# Patient Record
Sex: FEMALE | Race: WHITE | NOT HISPANIC OR LATINO | Employment: FULL TIME | URBAN - METROPOLITAN AREA
[De-identification: names, ages, dates, MRNs, and addresses within clinical notes are randomized per-mention and may not be internally consistent; named-entity substitution may affect disease eponyms.]

---

## 2023-06-22 LAB
ALANINE AMINOTRANSFERASE (SGPT) (U/L) IN SER/PLAS: 31 U/L (ref 7–45)
ALBUMIN (G/DL) IN SER/PLAS: 4.7 G/DL (ref 3.4–5)
ALKALINE PHOSPHATASE (U/L) IN SER/PLAS: 82 U/L (ref 33–110)
ANION GAP IN SER/PLAS: 15 MMOL/L (ref 10–20)
ASPARTATE AMINOTRANSFERASE (SGOT) (U/L) IN SER/PLAS: 25 U/L (ref 9–39)
BASOPHILS (10*3/UL) IN BLOOD BY AUTOMATED COUNT: 0.03 X10E9/L (ref 0–0.1)
BASOPHILS/100 LEUKOCYTES IN BLOOD BY AUTOMATED COUNT: 0.3 % (ref 0–2)
BILIRUBIN TOTAL (MG/DL) IN SER/PLAS: 0.7 MG/DL (ref 0–1.2)
CALCIUM (MG/DL) IN SER/PLAS: 9.7 MG/DL (ref 8.6–10.6)
CARBON DIOXIDE, TOTAL (MMOL/L) IN SER/PLAS: 28 MMOL/L (ref 21–32)
CHLORIDE (MMOL/L) IN SER/PLAS: 101 MMOL/L (ref 98–107)
CHOLESTEROL (MG/DL) IN SER/PLAS: 205 MG/DL (ref 0–199)
CHOLESTEROL IN HDL (MG/DL) IN SER/PLAS: 49.7 MG/DL
CHOLESTEROL/HDL RATIO: 4.1
CREATININE (MG/DL) IN SER/PLAS: 0.85 MG/DL (ref 0.5–1.05)
EOSINOPHILS (10*3/UL) IN BLOOD BY AUTOMATED COUNT: 0.08 X10E9/L (ref 0–0.7)
EOSINOPHILS/100 LEUKOCYTES IN BLOOD BY AUTOMATED COUNT: 0.9 % (ref 0–6)
ERYTHROCYTE DISTRIBUTION WIDTH (RATIO) BY AUTOMATED COUNT: 12.4 % (ref 11.5–14.5)
ERYTHROCYTE MEAN CORPUSCULAR HEMOGLOBIN CONCENTRATION (G/DL) BY AUTOMATED: 33.9 G/DL (ref 32–36)
ERYTHROCYTE MEAN CORPUSCULAR VOLUME (FL) BY AUTOMATED COUNT: 89 FL (ref 80–100)
ERYTHROCYTES (10*6/UL) IN BLOOD BY AUTOMATED COUNT: 4.55 X10E12/L (ref 4–5.2)
GFR FEMALE: 82 ML/MIN/1.73M2
GLUCOSE (MG/DL) IN SER/PLAS: 90 MG/DL (ref 74–99)
HEMATOCRIT (%) IN BLOOD BY AUTOMATED COUNT: 40.4 % (ref 36–46)
HEMOGLOBIN (G/DL) IN BLOOD: 13.7 G/DL (ref 12–16)
IMMATURE GRANULOCYTES/100 LEUKOCYTES IN BLOOD BY AUTOMATED COUNT: 0.2 % (ref 0–0.9)
LDL: 122 MG/DL (ref 0–99)
LEUKOCYTES (10*3/UL) IN BLOOD BY AUTOMATED COUNT: 9 X10E9/L (ref 4.4–11.3)
LYMPHOCYTES (10*3/UL) IN BLOOD BY AUTOMATED COUNT: 3.41 X10E9/L (ref 1.2–4.8)
LYMPHOCYTES/100 LEUKOCYTES IN BLOOD BY AUTOMATED COUNT: 38.1 % (ref 13–44)
MONOCYTES (10*3/UL) IN BLOOD BY AUTOMATED COUNT: 0.58 X10E9/L (ref 0.1–1)
MONOCYTES/100 LEUKOCYTES IN BLOOD BY AUTOMATED COUNT: 6.5 % (ref 2–10)
NEUTROPHILS (10*3/UL) IN BLOOD BY AUTOMATED COUNT: 4.83 X10E9/L (ref 1.2–7.7)
NEUTROPHILS/100 LEUKOCYTES IN BLOOD BY AUTOMATED COUNT: 54 % (ref 40–80)
NRBC (PER 100 WBCS) BY AUTOMATED COUNT: 0 /100 WBC (ref 0–0)
PLATELETS (10*3/UL) IN BLOOD AUTOMATED COUNT: 228 X10E9/L (ref 150–450)
POTASSIUM (MMOL/L) IN SER/PLAS: 3.5 MMOL/L (ref 3.5–5.3)
PROTEIN TOTAL: 7.3 G/DL (ref 6.4–8.2)
SODIUM (MMOL/L) IN SER/PLAS: 140 MMOL/L (ref 136–145)
TRIGLYCERIDE (MG/DL) IN SER/PLAS: 165 MG/DL (ref 0–149)
UREA NITROGEN (MG/DL) IN SER/PLAS: 14 MG/DL (ref 6–23)
VLDL: 33 MG/DL (ref 0–40)

## 2023-11-02 PROBLEM — F17.210: Status: ACTIVE | Noted: 2023-11-02

## 2023-11-02 PROBLEM — E66.9 OBESITY: Status: ACTIVE | Noted: 2023-11-02

## 2023-11-02 PROBLEM — E78.5 HYPERLIPIDEMIA: Status: ACTIVE | Noted: 2023-11-02

## 2023-11-02 PROBLEM — M19.90 OSTEOARTHRITIS: Status: ACTIVE | Noted: 2023-11-02

## 2023-11-02 PROBLEM — M51.34 DEGENERATION OF INTERVERTEBRAL DISC OF THORACIC REGION: Status: ACTIVE | Noted: 2023-11-02

## 2023-11-02 PROBLEM — N94.9 ADNEXAL CYST: Status: ACTIVE | Noted: 2023-11-02

## 2023-11-02 PROBLEM — M54.31 BILATERAL SCIATICA: Status: ACTIVE | Noted: 2023-11-02

## 2023-11-02 PROBLEM — K21.9 GASTROESOPHAGEAL REFLUX DISEASE: Status: ACTIVE | Noted: 2023-11-02

## 2023-11-02 PROBLEM — I10 HYPERTENSION: Status: ACTIVE | Noted: 2023-11-02

## 2023-11-02 PROBLEM — M54.32 BILATERAL SCIATICA: Status: ACTIVE | Noted: 2023-11-02

## 2023-11-02 RX ORDER — METOPROLOL SUCCINATE 25 MG/1
1 TABLET, EXTENDED RELEASE ORAL DAILY
COMMUNITY
Start: 2019-04-29 | End: 2024-02-23 | Stop reason: SDUPTHER

## 2023-11-02 RX ORDER — CHLORTHALIDONE 25 MG/1
1 TABLET ORAL DAILY
COMMUNITY
Start: 2020-05-11 | End: 2023-12-26 | Stop reason: SDUPTHER

## 2023-11-02 RX ORDER — MELOXICAM 15 MG/1
1 TABLET ORAL DAILY
COMMUNITY
Start: 2019-04-29 | End: 2024-01-24 | Stop reason: SDUPTHER

## 2023-11-02 RX ORDER — UBIDECARENONE 30 MG
CAPSULE ORAL
COMMUNITY
Start: 2021-12-13

## 2023-11-02 RX ORDER — LOSARTAN POTASSIUM 100 MG/1
1 TABLET ORAL DAILY
COMMUNITY
Start: 2019-09-09 | End: 2024-02-23 | Stop reason: SDUPTHER

## 2023-11-02 RX ORDER — ACETAMINOPHEN 500 MG
1 TABLET ORAL DAILY
COMMUNITY
Start: 2016-08-29

## 2023-11-02 RX ORDER — CHOLECALCIFEROL (VITAMIN D3) 25 MCG
1 TABLET ORAL DAILY
COMMUNITY
End: 2023-12-14 | Stop reason: ALTCHOICE

## 2023-11-02 RX ORDER — CALC/MAG/B COMPLEX/D3/HERB 61
1 TABLET ORAL AS NEEDED
COMMUNITY
Start: 2014-02-10

## 2023-11-02 RX ORDER — OXYCODONE HYDROCHLORIDE 5 MG/1
1 TABLET ORAL EVERY 6 HOURS PRN
COMMUNITY
Start: 2022-12-08 | End: 2023-12-14 | Stop reason: ALTCHOICE

## 2023-11-02 RX ORDER — KRILL/OM-3/DHA/EPA/PHOSPHO/AST 1000-170MG
CAPSULE ORAL DAILY
COMMUNITY
Start: 2016-02-29

## 2023-11-02 RX ORDER — GLUCOSAM/CHONDRO/HERB 149/HYAL 750-100 MG
1 TABLET ORAL DAILY
COMMUNITY
End: 2023-12-14 | Stop reason: ALTCHOICE

## 2023-11-02 RX ORDER — CYCLOBENZAPRINE HCL 10 MG
TABLET ORAL AS NEEDED
COMMUNITY

## 2023-11-03 ENCOUNTER — OFFICE VISIT (OUTPATIENT)
Dept: OBSTETRICS AND GYNECOLOGY | Facility: CLINIC | Age: 53
End: 2023-11-03
Payer: COMMERCIAL

## 2023-11-03 VITALS
SYSTOLIC BLOOD PRESSURE: 120 MMHG | DIASTOLIC BLOOD PRESSURE: 93 MMHG | WEIGHT: 218 LBS | HEIGHT: 67 IN | BODY MASS INDEX: 34.21 KG/M2

## 2023-11-03 DIAGNOSIS — Z01.419 ENCOUNTER FOR GYNECOLOGICAL EXAMINATION WITHOUT ABNORMAL FINDING: Primary | ICD-10-CM

## 2023-11-03 PROCEDURE — 99396 PREV VISIT EST AGE 40-64: CPT | Performed by: OBSTETRICS & GYNECOLOGY

## 2023-11-03 PROCEDURE — 3074F SYST BP LT 130 MM HG: CPT | Performed by: OBSTETRICS & GYNECOLOGY

## 2023-11-03 PROCEDURE — 3008F BODY MASS INDEX DOCD: CPT | Performed by: OBSTETRICS & GYNECOLOGY

## 2023-11-03 PROCEDURE — 3080F DIAST BP >= 90 MM HG: CPT | Performed by: OBSTETRICS & GYNECOLOGY

## 2023-11-04 NOTE — PROGRESS NOTES
Subjective   Marlin Lopez is a 53 y.o. female here for a routine exam. Current complaints: She had a screening mammogram through her PCP in 2023 that showed a left breast mass.  Diagnostic imaging is ordered.    She has a history of BSO with Gyn Oncology in 2022 that was benign after imaging showed complex right ovarian cyst.    She has no postmenopausal bleeding, no pelvic pain, no dysuria, no change in bowel habits or vaginal discharge.    She is current on her Cologuard.    Personal health questionnaire reviewed: yes.     Gynecologic History  Patient's last menstrual period was 2022.  Contraception: post menopausal status  Last Pap: 22. Results were: normal  Last mammogram: 23. Results were:  Follow-up diagnostic imaging for the left breast is managed by her PCP.    Obstetric History  OB History    Para Term  AB Living   0 0 0 0 0 0   SAB IAB Ectopic Multiple Live Births   0 0 0 0 0       Objective   Constitutional: Alert and in no acute distress. Well developed, well nourished.   Head and Face: Head and face: Normal.    Eyes: Normal external exam - nonicteric sclera, extraocular movements intact (EOMI) and no ptosis.   Neck: No neck asymmetry. Supple. Thyroid not enlarged and there were no palpable thyroid nodules.    Pulmonary: No respiratory distress.   Chest: Breasts: Normal appearance, no nipple discharge and no skin changes. Palpation of breasts and axillae: No palpable mass and no axillary lymphadenopathy.   Abdomen: Soft nontender; no abdominal mass palpated. No organomegaly. No hernias.   Genitourinary: External genitalia: Normal. No inguinal lymphadenopathy. Bartholin's Urethral and Skenes Glands: Normal. Urethra: Normal.  Bladder: Normal on palpation. Vagina: Normal. Cervix: Normal. No pap sent. Uterus: Normal.  Right Adnexa/parametria: Normal.  Left Adnexa/parametria: Normal.  Inspection of Perianal Area: Normal.   Musculoskeletal: No joint swelling seen,  normal movements of all extremities.   Skin: Normal skin color and pigmentation, normal skin turgor, and no rash.   Neurologic: Non-focal. Grossly intact.   Psychiatric: Alert and oriented x 3. Affect normal to patient baseline. Mood: Appropriate.  Physical Exam     Assessment/Plan   Healthy female exam.    This is a 63-year-old female with a normal exam.  No Pap was sent, she is high risk HPV negative.  She is status post a BSO for benign ovarian cyst.  She we will follow-up in 1 year.

## 2023-12-14 ENCOUNTER — OFFICE VISIT (OUTPATIENT)
Dept: PRIMARY CARE | Facility: CLINIC | Age: 53
End: 2023-12-14
Payer: COMMERCIAL

## 2023-12-14 VITALS
HEIGHT: 67 IN | BODY MASS INDEX: 35.2 KG/M2 | WEIGHT: 224.3 LBS | TEMPERATURE: 97.3 F | SYSTOLIC BLOOD PRESSURE: 114 MMHG | OXYGEN SATURATION: 96 % | DIASTOLIC BLOOD PRESSURE: 78 MMHG | HEART RATE: 76 BPM

## 2023-12-14 DIAGNOSIS — M15.9 PRIMARY OSTEOARTHRITIS INVOLVING MULTIPLE JOINTS: ICD-10-CM

## 2023-12-14 DIAGNOSIS — I10 PRIMARY HYPERTENSION: Primary | ICD-10-CM

## 2023-12-14 DIAGNOSIS — K21.9 GASTROESOPHAGEAL REFLUX DISEASE WITHOUT ESOPHAGITIS: ICD-10-CM

## 2023-12-14 PROCEDURE — 99213 OFFICE O/P EST LOW 20 MIN: CPT | Performed by: INTERNAL MEDICINE

## 2023-12-14 PROCEDURE — 3078F DIAST BP <80 MM HG: CPT | Performed by: INTERNAL MEDICINE

## 2023-12-14 PROCEDURE — 3074F SYST BP LT 130 MM HG: CPT | Performed by: INTERNAL MEDICINE

## 2023-12-14 ASSESSMENT — PATIENT HEALTH QUESTIONNAIRE - PHQ9
2. FEELING DOWN, DEPRESSED OR HOPELESS: NOT AT ALL
1. LITTLE INTEREST OR PLEASURE IN DOING THINGS: NOT AT ALL
SUM OF ALL RESPONSES TO PHQ9 QUESTIONS 1 AND 2: 0

## 2023-12-14 NOTE — PROGRESS NOTES
"Subjective   Patient ID: Marlin Lopez is a 53 y.o. female who presents for Follow-up.    HPI   compliant with blood pressure medications. Denies headache, dizziness, chest pain, shortness of breath or palpitation.  Watch salt intake in diet.  No exertional chest pain or dizziness or palpitation. No blurred vision or double vision     Taking Krill oil and watching diet for cholesterol lowering     Gerd controlled on rx.takes prilosec every other day  On meloxicam daily  Seen gyn  Review of Systems  GENERAL;:Denies weight changes,fatigue,fever,chills or sweats  HEENT:Denies headache,sorethroat,sinus drainage ,vision or hearing issues  CVS:No chest pain,sob or palpitation.No leg swelling  RESP:No c/c cough,cp,sob or wheezing  GI:NO abdominal pain,nausea,heartburn,vomiting,or bowel changes.  :No painful urination,frequency or hematuria.No incontinence  NEURO:No dizziness,weakness,numbness or tingling.No memory issues  PSYCH:No anxiety,depression or sleep issues      Objective   /78   Pulse 76   Temp 36.3 °C (97.3 °F)   Ht 1.702 m (5' 7\")   Wt 102 kg (224 lb 4.8 oz)   LMP 04/14/2022   SpO2 96%   BMI 35.13 kg/m²     Physical Exam  Gen. patient is not in acute distress,  HEENT: No pallor or icterus.  Neck: Supple  CVS: S1, S2, regular in rate and rhythm. No peripheral edema.No murmur  Chest: Clear to auscultation bilateral. Good air entry.  abd:nad  Extremities no edema or tenderness.trigger fingers 3rd and 4th left  Neuro: Grossly nonfocal, alert and oriented.  Psych: Mood and affect normal, good judgment and insight   Assessment/Plan   Problem List Items Addressed This Visit             ICD-10-CM    Gastroesophageal reflux disease K21.9    Hypertension - Primary I10    Osteoarthritis M19.90     Blood pressure is under control.  Same treatment  Continue PPI  Continue meloxicam     "

## 2023-12-26 DIAGNOSIS — N94.9 ADNEXAL CYST: Primary | ICD-10-CM

## 2023-12-26 DIAGNOSIS — I10 PRIMARY HYPERTENSION: ICD-10-CM

## 2023-12-26 RX ORDER — CHLORTHALIDONE 25 MG/1
25 TABLET ORAL DAILY
Qty: 90 TABLET | Refills: 0 | Status: SHIPPED | OUTPATIENT
Start: 2023-12-26 | End: 2024-03-25 | Stop reason: SDUPTHER

## 2024-01-24 ENCOUNTER — HOSPITAL ENCOUNTER (OUTPATIENT)
Dept: RADIOLOGY | Facility: CLINIC | Age: 54
Discharge: HOME | End: 2024-01-24
Payer: COMMERCIAL

## 2024-01-24 DIAGNOSIS — R92.8 OTHER ABNORMAL AND INCONCLUSIVE FINDINGS ON DIAGNOSTIC IMAGING OF BREAST: ICD-10-CM

## 2024-01-24 DIAGNOSIS — M15.9 PRIMARY OSTEOARTHRITIS INVOLVING MULTIPLE JOINTS: Primary | ICD-10-CM

## 2024-01-24 PROCEDURE — 76982 USE 1ST TARGET LESION: CPT | Mod: LT

## 2024-01-24 PROCEDURE — 77061 BREAST TOMOSYNTHESIS UNI: CPT | Mod: LEFT SIDE | Performed by: RADIOLOGY

## 2024-01-24 PROCEDURE — 77061 BREAST TOMOSYNTHESIS UNI: CPT | Mod: LT

## 2024-01-24 PROCEDURE — 76642 ULTRASOUND BREAST LIMITED: CPT | Mod: LT

## 2024-01-24 PROCEDURE — 77065 DX MAMMO INCL CAD UNI: CPT | Mod: LEFT SIDE | Performed by: RADIOLOGY

## 2024-01-24 PROCEDURE — 76642 ULTRASOUND BREAST LIMITED: CPT | Mod: LEFT SIDE | Performed by: RADIOLOGY

## 2024-01-24 RX ORDER — MELOXICAM 15 MG/1
15 TABLET ORAL DAILY
Qty: 90 TABLET | Refills: 1 | Status: SHIPPED | OUTPATIENT
Start: 2024-01-24 | End: 2024-07-22

## 2024-02-23 DIAGNOSIS — I10 PRIMARY HYPERTENSION: Primary | ICD-10-CM

## 2024-02-23 RX ORDER — METOPROLOL SUCCINATE 25 MG/1
25 TABLET, EXTENDED RELEASE ORAL DAILY
Qty: 30 TABLET | Refills: 0 | Status: SHIPPED | OUTPATIENT
Start: 2024-02-23 | End: 2024-03-25 | Stop reason: SDUPTHER

## 2024-02-23 RX ORDER — LOSARTAN POTASSIUM 100 MG/1
100 TABLET ORAL DAILY
Qty: 90 TABLET | Refills: 0 | Status: SHIPPED | OUTPATIENT
Start: 2024-02-23 | End: 2024-05-23 | Stop reason: SDUPTHER

## 2024-03-25 DIAGNOSIS — I10 PRIMARY HYPERTENSION: ICD-10-CM

## 2024-03-25 RX ORDER — METOPROLOL SUCCINATE 25 MG/1
25 TABLET, EXTENDED RELEASE ORAL DAILY
Qty: 90 TABLET | Refills: 3 | Status: SHIPPED | OUTPATIENT
Start: 2024-03-25 | End: 2025-03-25

## 2024-03-25 RX ORDER — CHLORTHALIDONE 25 MG/1
25 TABLET ORAL DAILY
Qty: 90 TABLET | Refills: 3 | Status: SHIPPED | OUTPATIENT
Start: 2024-03-25 | End: 2025-03-25

## 2024-05-23 ENCOUNTER — TELEPHONE (OUTPATIENT)
Dept: PRIMARY CARE | Facility: CLINIC | Age: 54
End: 2024-05-23
Payer: COMMERCIAL

## 2024-05-23 DIAGNOSIS — I10 PRIMARY HYPERTENSION: ICD-10-CM

## 2024-05-23 RX ORDER — LOSARTAN POTASSIUM 100 MG/1
100 TABLET ORAL DAILY
Qty: 90 TABLET | Refills: 3 | Status: SHIPPED | OUTPATIENT
Start: 2024-05-23 | End: 2025-05-23

## 2024-06-12 ENCOUNTER — LAB (OUTPATIENT)
Dept: LAB | Facility: LAB | Age: 54
End: 2024-06-12
Payer: COMMERCIAL

## 2024-06-12 ENCOUNTER — APPOINTMENT (OUTPATIENT)
Dept: PRIMARY CARE | Facility: CLINIC | Age: 54
End: 2024-06-12
Payer: COMMERCIAL

## 2024-06-12 VITALS
OXYGEN SATURATION: 98 % | HEART RATE: 72 BPM | DIASTOLIC BLOOD PRESSURE: 80 MMHG | WEIGHT: 224.7 LBS | BODY MASS INDEX: 35.27 KG/M2 | SYSTOLIC BLOOD PRESSURE: 118 MMHG | HEIGHT: 67 IN

## 2024-06-12 DIAGNOSIS — I10 PRIMARY HYPERTENSION: ICD-10-CM

## 2024-06-12 DIAGNOSIS — K21.9 GASTROESOPHAGEAL REFLUX DISEASE WITHOUT ESOPHAGITIS: ICD-10-CM

## 2024-06-12 DIAGNOSIS — Z12.31 ENCOUNTER FOR SCREENING MAMMOGRAM FOR BREAST CANCER: ICD-10-CM

## 2024-06-12 DIAGNOSIS — F17.200 HEAVY SMOKER: ICD-10-CM

## 2024-06-12 DIAGNOSIS — Z87.891 AGGRESSIVE FORMER SMOKER: ICD-10-CM

## 2024-06-12 DIAGNOSIS — E78.2 MIXED HYPERLIPIDEMIA: Primary | ICD-10-CM

## 2024-06-12 DIAGNOSIS — E78.2 MIXED HYPERLIPIDEMIA: ICD-10-CM

## 2024-06-12 DIAGNOSIS — L65.9 HAIR LOSS: ICD-10-CM

## 2024-06-12 DIAGNOSIS — F17.210 HEAVY TOBACCO SMOKER WHO SMOKES MORE THAN 10 CIGARETTES PER DAY: ICD-10-CM

## 2024-06-12 DIAGNOSIS — M15.9 PRIMARY OSTEOARTHRITIS INVOLVING MULTIPLE JOINTS: ICD-10-CM

## 2024-06-12 LAB
25(OH)D3 SERPL-MCNC: 38 NG/ML (ref 30–100)
ALBUMIN SERPL BCP-MCNC: 5.1 G/DL (ref 3.4–5)
ALP SERPL-CCNC: 89 U/L (ref 33–110)
ALT SERPL W P-5'-P-CCNC: 33 U/L (ref 7–45)
ANION GAP SERPL CALC-SCNC: 17 MMOL/L (ref 10–20)
AST SERPL W P-5'-P-CCNC: 23 U/L (ref 9–39)
BASOPHILS # BLD AUTO: 0.04 X10*3/UL (ref 0–0.1)
BASOPHILS NFR BLD AUTO: 0.4 %
BILIRUB SERPL-MCNC: 0.7 MG/DL (ref 0–1.2)
BUN SERPL-MCNC: 19 MG/DL (ref 6–23)
CALCIUM SERPL-MCNC: 10.2 MG/DL (ref 8.6–10.6)
CHLORIDE SERPL-SCNC: 97 MMOL/L (ref 98–107)
CHOLEST SERPL-MCNC: 251 MG/DL (ref 0–199)
CHOLESTEROL/HDL RATIO: 4.3
CO2 SERPL-SCNC: 30 MMOL/L (ref 21–32)
CREAT SERPL-MCNC: 0.77 MG/DL (ref 0.5–1.05)
EGFRCR SERPLBLD CKD-EPI 2021: >90 ML/MIN/1.73M*2
EOSINOPHIL # BLD AUTO: 0.17 X10*3/UL (ref 0–0.7)
EOSINOPHIL NFR BLD AUTO: 1.8 %
ERYTHROCYTE [DISTWIDTH] IN BLOOD BY AUTOMATED COUNT: 12.3 % (ref 11.5–14.5)
GLUCOSE SERPL-MCNC: 109 MG/DL (ref 74–99)
HCT VFR BLD AUTO: 45.4 % (ref 36–46)
HDLC SERPL-MCNC: 58.9 MG/DL
HGB BLD-MCNC: 15.4 G/DL (ref 12–16)
IMM GRANULOCYTES # BLD AUTO: 0.03 X10*3/UL (ref 0–0.7)
IMM GRANULOCYTES NFR BLD AUTO: 0.3 % (ref 0–0.9)
LDLC SERPL CALC-MCNC: 147 MG/DL
LYMPHOCYTES # BLD AUTO: 3.34 X10*3/UL (ref 1.2–4.8)
LYMPHOCYTES NFR BLD AUTO: 36 %
MCH RBC QN AUTO: 30.3 PG (ref 26–34)
MCHC RBC AUTO-ENTMCNC: 33.9 G/DL (ref 32–36)
MCV RBC AUTO: 89 FL (ref 80–100)
MONOCYTES # BLD AUTO: 0.64 X10*3/UL (ref 0.1–1)
MONOCYTES NFR BLD AUTO: 6.9 %
NEUTROPHILS # BLD AUTO: 5.05 X10*3/UL (ref 1.2–7.7)
NEUTROPHILS NFR BLD AUTO: 54.6 %
NON HDL CHOLESTEROL: 192 MG/DL (ref 0–149)
NRBC BLD-RTO: 0 /100 WBCS (ref 0–0)
PLATELET # BLD AUTO: 266 X10*3/UL (ref 150–450)
POTASSIUM SERPL-SCNC: 3.7 MMOL/L (ref 3.5–5.3)
PROT SERPL-MCNC: 7.7 G/DL (ref 6.4–8.2)
RBC # BLD AUTO: 5.09 X10*6/UL (ref 4–5.2)
SODIUM SERPL-SCNC: 140 MMOL/L (ref 136–145)
TRIGL SERPL-MCNC: 227 MG/DL (ref 0–149)
TSH SERPL-ACNC: 1.63 MIU/L (ref 0.44–3.98)
VLDL: 45 MG/DL (ref 0–40)
WBC # BLD AUTO: 9.3 X10*3/UL (ref 4.4–11.3)

## 2024-06-12 PROCEDURE — 36415 COLL VENOUS BLD VENIPUNCTURE: CPT

## 2024-06-12 PROCEDURE — 82306 VITAMIN D 25 HYDROXY: CPT

## 2024-06-12 PROCEDURE — 3079F DIAST BP 80-89 MM HG: CPT | Performed by: INTERNAL MEDICINE

## 2024-06-12 PROCEDURE — 99214 OFFICE O/P EST MOD 30 MIN: CPT | Performed by: INTERNAL MEDICINE

## 2024-06-12 PROCEDURE — 3074F SYST BP LT 130 MM HG: CPT | Performed by: INTERNAL MEDICINE

## 2024-06-12 PROCEDURE — 4004F PT TOBACCO SCREEN RCVD TLK: CPT | Performed by: INTERNAL MEDICINE

## 2024-06-12 PROCEDURE — 85025 COMPLETE CBC W/AUTO DIFF WBC: CPT

## 2024-06-12 PROCEDURE — 84443 ASSAY THYROID STIM HORMONE: CPT

## 2024-06-12 PROCEDURE — 80061 LIPID PANEL: CPT

## 2024-06-12 PROCEDURE — 80053 COMPREHEN METABOLIC PANEL: CPT

## 2024-06-12 RX ORDER — MELOXICAM 15 MG/1
15 TABLET ORAL DAILY
Qty: 90 TABLET | Refills: 1 | Status: SHIPPED | OUTPATIENT
Start: 2024-06-12 | End: 2024-12-09

## 2024-06-12 RX ORDER — LOSARTAN POTASSIUM 100 MG/1
100 TABLET ORAL DAILY
Qty: 90 TABLET | Refills: 3 | Status: SHIPPED | OUTPATIENT
Start: 2024-06-12 | End: 2025-06-12

## 2024-06-12 ASSESSMENT — ENCOUNTER SYMPTOMS
HYPERTENSION: 1
BLURRED VISION: 0
NECK PAIN: 0
SWEATS: 0
ORTHOPNEA: 0
PALPITATIONS: 0
HEADACHES: 0
SHORTNESS OF BREATH: 0
PND: 0

## 2024-06-12 ASSESSMENT — PATIENT HEALTH QUESTIONNAIRE - PHQ9
SUM OF ALL RESPONSES TO PHQ9 QUESTIONS 1 AND 2: 0
1. LITTLE INTEREST OR PLEASURE IN DOING THINGS: NOT AT ALL
2. FEELING DOWN, DEPRESSED OR HOPELESS: NOT AT ALL

## 2024-06-12 NOTE — PROGRESS NOTES
Subjective   Patient ID: Marlin Lopez is a 54 y.o. female who presents for Follow-up.    Hypertension  This is a recurrent problem. The current episode started more than 1 year ago. The problem has been resolved since onset. The problem is controlled. Pertinent negatives include no anxiety, blurred vision, chest pain, headaches, malaise/fatigue, neck pain, orthopnea, palpitations, peripheral edema, PND, shortness of breath or sweats. There are no associated agents to hypertension. Risk factors for coronary artery disease include family history, obesity, post-menopausal state and smoking/tobacco exposure. There are no compliance problems.       compliant with blood pressure medications. Denies headache, dizziness, chest pain, shortness of breath or palpitation.  Watch salt intake in diet.  No exertional chest pain or dizziness or palpitation. No blurred vision or double vision     Taking Krill oil and watching diet for cholesterol lowering     Gerd controlled on rx.takes prilosec every other day  On meloxicam daily,pain controlled.    Has 30 pack year smoking history.will quit cold turkey.  Review of Systems   Constitutional:  Negative for malaise/fatigue.   Eyes:  Negative for blurred vision.   Respiratory:  Negative for shortness of breath.    Cardiovascular:  Negative for chest pain, palpitations, orthopnea and PND.   Musculoskeletal:  Negative for neck pain.   Neurological:  Negative for headaches.     Constitutional: No recent weight loss, no fever or chills or night sweats.  No fatigue  HEENT: No blurred vision or double vision.  No hearing loss.  No sinus drainage or nosebleeds  CVS: No exertional chest pain or shortness of breath.  No palpitation or edema  Respiratory: No chronic cough or shortness of breath or wheezing  GI: No nausea vomiting or heartburn diarrhea or constipation.  No rectal bleed or bowel changes  Genitourinary: No urinary frequency or hesitancy.  No nocturia or blood in urine  Neuro: No  "weakness numbness or tingling.  No memory issues.  Psych: No anxiety or depression   Objective   /80   Pulse 72   Ht 1.702 m (5' 7\")   Wt 102 kg (224 lb 11.2 oz)   LMP 04/14/2022   SpO2 98%   BMI 35.19 kg/m²     Physical Exam  In general, well-appearing, not in acute distress, alert and oriented.  HEENT: No pallor or icterus  Neck: No lymphadenopathy, no stiffness.  Supple.  .No JVD  Chest: Clear to auscultation, good air entry.  CVS: S1 and S2 regular.  No murmur, no gallop, S3 or S4.  No peripheral edema.  No carotid bruit.  Abdomen: Soft, no tenderness, no hepatosplenomegaly.  Extremities: No calf tenderness.  No peripheral edema.   Neuro: No focal deficits.  Psych: Mood and affect normal.  Good judgment and insight   Assessment/Plan   Problem List Items Addressed This Visit             ICD-10-CM    Gastroesophageal reflux disease K21.9    Heavy tobacco smoker who smokes more than 10 cigarettes per day F17.210    Hyperlipidemia - Primary E78.5    Relevant Orders    Lipid Panel    Hypertension I10    Relevant Orders    CBC and Auto Differential    Comprehensive Metabolic Panel    Osteoarthritis M19.90     Other Visit Diagnoses         Codes    Encounter for screening mammogram for breast cancer     Z12.31    Relevant Orders    BI mammo bilateral screening tomosynthesis    Hair loss     L65.9    Relevant Orders    TSH with reflex to Free T4 if abnormal    Vitamin D 25-Hydroxy,Total (for eval of Vitamin D levels)    Aggressive former smoker     Z87.891    Relevant Orders    CT lung screening low dose    Heavy smoker     F17.200    Relevant Orders    CT lung screening low dose          Blood pressure is controlled controlled  Continue same treatment  Check labs    Check thyroid function and vitamin D due to hair loss  Patient has 30-pack-year smoking history.  Counseled about smoking cessation.  She will quit cold turkey  No symptoms of lung cancer.  Risks and benefits of screening discussed.  Shared " decision made and order given  Continue Prevacid.  Symptoms controlled  Continue meloxicam which helps with arthritis pain  Mammogram ordered  Healthy diet plan discussed Cologuard negative last year  Diet changes to lower LDL discussed.  Consider CT scoring in the future  Follow-up in 6 months and as needed

## 2024-06-13 NOTE — RESULT ENCOUNTER NOTE
Reviewed your labs.  Labs are okay except slightly elevated blood sugar and high cholesterol.  Eat more plant-based foods and avoid snacks.  Monitor fiber intake and make sure you have 30 g of fiber daily.  Will do repeat lipid testing at follow-up and decide on treatment

## 2024-06-19 ENCOUNTER — TELEPHONE (OUTPATIENT)
Dept: PRIMARY CARE | Facility: CLINIC | Age: 54
End: 2024-06-19

## 2024-06-19 DIAGNOSIS — N60.02 BREAST CYST, LEFT: Primary | ICD-10-CM

## 2024-06-19 NOTE — TELEPHONE ENCOUNTER
Patient needs an order for an US of Breast to go with her Mammogram. Last mammogram they found a cyst and they would like to do the US as well.

## 2024-07-31 ENCOUNTER — HOSPITAL ENCOUNTER (OUTPATIENT)
Dept: RADIOLOGY | Facility: CLINIC | Age: 54
Discharge: HOME | End: 2024-07-31
Payer: COMMERCIAL

## 2024-07-31 VITALS — WEIGHT: 224 LBS | BODY MASS INDEX: 35.16 KG/M2 | HEIGHT: 67 IN

## 2024-07-31 DIAGNOSIS — N60.02 BREAST CYST, LEFT: ICD-10-CM

## 2024-07-31 DIAGNOSIS — Z87.891 AGGRESSIVE FORMER SMOKER: ICD-10-CM

## 2024-07-31 DIAGNOSIS — F17.200 HEAVY SMOKER: ICD-10-CM

## 2024-07-31 DIAGNOSIS — Z12.31 ENCOUNTER FOR SCREENING MAMMOGRAM FOR BREAST CANCER: ICD-10-CM

## 2024-07-31 PROCEDURE — 77063 BREAST TOMOSYNTHESIS BI: CPT | Performed by: RADIOLOGY

## 2024-07-31 PROCEDURE — 77067 SCR MAMMO BI INCL CAD: CPT | Performed by: RADIOLOGY

## 2024-07-31 PROCEDURE — 76642 ULTRASOUND BREAST LIMITED: CPT | Performed by: RADIOLOGY

## 2024-07-31 PROCEDURE — 71271 CT THORAX LUNG CANCER SCR C-: CPT

## 2024-07-31 PROCEDURE — 76982 USE 1ST TARGET LESION: CPT | Mod: LT

## 2024-07-31 PROCEDURE — 77067 SCR MAMMO BI INCL CAD: CPT

## 2024-07-31 PROCEDURE — 76642 ULTRASOUND BREAST LIMITED: CPT | Mod: LT

## 2024-09-18 ENCOUNTER — OFFICE VISIT (OUTPATIENT)
Dept: ORTHOPEDIC SURGERY | Facility: CLINIC | Age: 54
End: 2024-09-18
Payer: COMMERCIAL

## 2024-09-18 DIAGNOSIS — M79.641 RIGHT HAND PAIN: Primary | ICD-10-CM

## 2024-09-18 DIAGNOSIS — M65.331 TRIGGER MIDDLE FINGER OF RIGHT HAND: ICD-10-CM

## 2024-09-18 PROCEDURE — 99213 OFFICE O/P EST LOW 20 MIN: CPT | Performed by: ORTHOPAEDIC SURGERY

## 2024-09-18 PROCEDURE — 20550 NJX 1 TENDON SHEATH/LIGAMENT: CPT | Mod: RT | Performed by: ORTHOPAEDIC SURGERY

## 2024-09-18 PROCEDURE — 4004F PT TOBACCO SCREEN RCVD TLK: CPT | Performed by: ORTHOPAEDIC SURGERY

## 2024-09-18 PROCEDURE — 2500000005 HC RX 250 GENERAL PHARMACY W/O HCPCS: Performed by: ORTHOPAEDIC SURGERY

## 2024-09-18 PROCEDURE — 2500000004 HC RX 250 GENERAL PHARMACY W/ HCPCS (ALT 636 FOR OP/ED): Performed by: ORTHOPAEDIC SURGERY

## 2024-09-18 RX ORDER — LIDOCAINE HYDROCHLORIDE 20 MG/ML
0.5 INJECTION, SOLUTION INFILTRATION; PERINEURAL
Status: COMPLETED | OUTPATIENT
Start: 2024-09-18 | End: 2024-09-18

## 2024-09-18 RX ORDER — TRIAMCINOLONE ACETONIDE 40 MG/ML
10 INJECTION, SUSPENSION INTRA-ARTICULAR; INTRAMUSCULAR
Status: COMPLETED | OUTPATIENT
Start: 2024-09-18 | End: 2024-09-18

## 2024-09-18 NOTE — PROGRESS NOTES
Subjective    Patient ID: Marlin Lopez is a 54 y.o. female.    Chief Complaint: Pain of the Right Middle Finger (OPNP R 3RD TF X 6 MONTHS NKI/PREV INJ OF L 3RD AND 4TH DIGIT FOR TF PT STATES THOSE HAVE BEEN DOING GREAT)     Last Surgery: No surgery found  Last Surgery Date: No surgery found    HPI  Patient is a 54-year-old right-hand-dominant female who comes in with a nearly 6-month history of slowly worsening pain and locking in her right middle finger.  She states it feels similar to other trigger fingers she has had in her other hand.  She denies numbness and paresthesias.  She has not had any previous treatment.    Objective   Ortho Exam  Patient is in no acute distress.  Exam of her right hand and wrist reveals her skin envelope is intact.  There is no swelling.  There is no warmth erythema.  She is tender to palpation over the middle finger A1 pulley.  The finger was triggering with flexion at the PIP joint.  However she had full range of motion in that middle finger.    Assessment/Plan   Encounter Diagnoses:  Right hand pain    Trigger middle finger of right hand    Patient has evidence of a right middle finger trigger digit.  We discussed treatment options and she elected undergo a Kenalog injection.    Hand / UE Inj/Asp: R long A1 for trigger finger on 9/18/2024 8:55 AM  Indications: tendon swelling  Details: 25 G needle, volar approach  Medications: 10 mg triamcinolone acetonide 40 mg/mL; 0.5 mL lidocaine 20 mg/mL (2 %)  Outcome: tolerated well, no immediate complications  Procedure, treatment alternatives, risks and benefits explained, specific risks discussed. Consent was given by the patient. Immediately prior to procedure a time out was called to verify the correct patient, procedure, equipment, support staff and site/side marked as required. Patient was prepped and draped in the usual sterile fashion.         Patient was informed that takes about a week for the injection of an effect.  She otherwise  may use her right hand is much as she can tolerate.  She will follow-up as her symptoms dictate.

## 2024-11-08 ENCOUNTER — APPOINTMENT (OUTPATIENT)
Dept: OBSTETRICS AND GYNECOLOGY | Facility: CLINIC | Age: 54
End: 2024-11-08
Payer: COMMERCIAL

## 2024-11-08 VITALS
HEART RATE: 79 BPM | WEIGHT: 226 LBS | DIASTOLIC BLOOD PRESSURE: 83 MMHG | HEIGHT: 68 IN | SYSTOLIC BLOOD PRESSURE: 119 MMHG | BODY MASS INDEX: 34.25 KG/M2

## 2024-11-08 DIAGNOSIS — Z01.419 ENCOUNTER FOR GYNECOLOGICAL EXAMINATION WITHOUT ABNORMAL FINDING: Primary | ICD-10-CM

## 2024-11-08 PROBLEM — N94.9 ADNEXAL CYST: Status: RESOLVED | Noted: 2023-11-02 | Resolved: 2024-11-08

## 2024-11-08 PROCEDURE — 3008F BODY MASS INDEX DOCD: CPT | Performed by: OBSTETRICS & GYNECOLOGY

## 2024-11-08 PROCEDURE — 99396 PREV VISIT EST AGE 40-64: CPT | Performed by: OBSTETRICS & GYNECOLOGY

## 2024-11-08 PROCEDURE — 3079F DIAST BP 80-89 MM HG: CPT | Performed by: OBSTETRICS & GYNECOLOGY

## 2024-11-08 PROCEDURE — 4004F PT TOBACCO SCREEN RCVD TLK: CPT | Performed by: OBSTETRICS & GYNECOLOGY

## 2024-11-08 PROCEDURE — 3074F SYST BP LT 130 MM HG: CPT | Performed by: OBSTETRICS & GYNECOLOGY

## 2024-11-08 NOTE — PROGRESS NOTES
Subjective   Marlin Lopez is a 54 y.o. female here for a routine exam.  She has a history of BSO in 2022 with Dr. Citlaly Fisher of Gyn Oncology that noted benign cyst.    She has no postmenopausal bleeding or discharge.  No dysuria or change in bowel habits.  She is current on her Cologuard from 2023.    She does have a left breast cyst and she is current on her breast imaging from 2024 through her PCP.    She works as a hairdresser.    Personal health questionnaire reviewed: yes.     Gynecologic History  Patient's last menstrual period was 2022.  Contraception:  Bilateral oophorectomy  Last Pap: 22. Results were: normal  Last mammogram: 24. Results were: normal    Obstetric History  OB History    Para Term  AB Living   0 0 0 0 0 0   SAB IAB Ectopic Multiple Live Births   0 0 0 0 0       Objective   Constitutional: Alert and in no acute distress. Well developed, well nourished.   Head and Face: Head and face: Normal.    Eyes: Normal external exam - nonicteric sclera, extraocular movements intact (EOMI) and no ptosis.   Neck: No neck asymmetry. Supple. Thyroid not enlarged and there were no palpable thyroid nodules.    Pulmonary: No respiratory distress.   Chest: Breasts: Normal appearance, no nipple discharge and no skin changes. Palpation of breasts and axillae: No palpable mass and no axillary lymphadenopathy.   Abdomen: Soft nontender; no abdominal mass palpated. No organomegaly. No hernias.   Genitourinary: External genitalia: Normal. No inguinal lymphadenopathy. Bartholin's Urethral and Skenes Glands: Normal. Urethra: Normal.  Bladder: Normal on palpation. Vagina: Normal. Cervix: Normal.  Uterus: Normal.  Right Adnexa/parametria: Absent.  Left Adnexa/parametria: Absent.  Inspection of Perianal Area: Normal.   Musculoskeletal: No joint swelling seen, normal movements of all extremities.   Skin: Normal skin color and pigmentation, normal skin turgor, and no rash.    Neurologic: Non-focal. Grossly intact.   Psychiatric: Alert and oriented x 3. Affect normal to patient baseline. Mood: Appropriate.  Physical Exam     Assessment/Plan   Healthy female exam.  This is a 54-year-old female with a normal exam.  No Pap smear was sent, she is high risk HPV negative in 2022.    She is current on her breast imaging through her PCP.  I did remind patient she will need another left breast ultrasound at the time of her mammogram in July 2025.    I will see her in 1 year.  Education reviewed: self breast exams.

## 2024-12-11 ENCOUNTER — APPOINTMENT (OUTPATIENT)
Dept: PRIMARY CARE | Facility: CLINIC | Age: 54
End: 2024-12-11
Payer: COMMERCIAL

## 2024-12-11 VITALS
HEIGHT: 68 IN | BODY MASS INDEX: 33.71 KG/M2 | OXYGEN SATURATION: 98 % | WEIGHT: 222.4 LBS | HEART RATE: 65 BPM | SYSTOLIC BLOOD PRESSURE: 120 MMHG | DIASTOLIC BLOOD PRESSURE: 80 MMHG

## 2024-12-11 DIAGNOSIS — I10 PRIMARY HYPERTENSION: Primary | ICD-10-CM

## 2024-12-11 DIAGNOSIS — K21.9 GASTROESOPHAGEAL REFLUX DISEASE WITHOUT ESOPHAGITIS: ICD-10-CM

## 2024-12-11 DIAGNOSIS — M54.31 BILATERAL SCIATICA: ICD-10-CM

## 2024-12-11 DIAGNOSIS — E78.2 MIXED HYPERLIPIDEMIA: ICD-10-CM

## 2024-12-11 DIAGNOSIS — M54.32 BILATERAL SCIATICA: ICD-10-CM

## 2024-12-11 PROCEDURE — 4004F PT TOBACCO SCREEN RCVD TLK: CPT | Performed by: INTERNAL MEDICINE

## 2024-12-11 PROCEDURE — 3074F SYST BP LT 130 MM HG: CPT | Performed by: INTERNAL MEDICINE

## 2024-12-11 PROCEDURE — 3008F BODY MASS INDEX DOCD: CPT | Performed by: INTERNAL MEDICINE

## 2024-12-11 PROCEDURE — 3079F DIAST BP 80-89 MM HG: CPT | Performed by: INTERNAL MEDICINE

## 2024-12-11 PROCEDURE — 99214 OFFICE O/P EST MOD 30 MIN: CPT | Performed by: INTERNAL MEDICINE

## 2024-12-11 RX ORDER — CYCLOBENZAPRINE HCL 10 MG
10 TABLET ORAL 3 TIMES DAILY PRN
Qty: 30 TABLET | Refills: 0 | Status: SHIPPED | OUTPATIENT
Start: 2024-12-11

## 2024-12-11 ASSESSMENT — PATIENT HEALTH QUESTIONNAIRE - PHQ9
1. LITTLE INTEREST OR PLEASURE IN DOING THINGS: NOT AT ALL
2. FEELING DOWN, DEPRESSED OR HOPELESS: NOT AT ALL
SUM OF ALL RESPONSES TO PHQ9 QUESTIONS 1 AND 2: 0

## 2024-12-11 NOTE — PROGRESS NOTES
"Subjective   Patient ID: Marlin Lopez is a 54 y.o. female who presents for Follow-up (Pt would like to know if you can refill her cyclobenzaprine 10mg due to the dr who prescribed it is now retired.).    HPI   compliant with blood pressure medications. Denies headache, dizziness, chest pain, shortness of breath or palpitation.  Watch salt intake in diet.  No exertional chest pain or dizziness or palpitation. No blurred vision or double vision     Taking Krill oil and watching diet for cholesterol lowering.     Gerd controlled on rx.takes prilosec every other day  On meloxicam daily,pain controlled.   needs flexeril prn for back  Has 30 pack year smoking history.will quit cold turkey.    Review of Systems  Constitutional:  Negative for malaise/fatigue.   Eyes:  Negative for blurred vision.   Respiratory:  Negative for shortness of breath.    Cardiovascular:  Negative for chest pain, palpitations, orthopnea and PND.   Musculoskeletal:  Negative for neck pain. Has back pain  Neurological:  Negative for headaches.      Objective   /80   Pulse 65   Ht 1.715 m (5' 7.5\")   Wt 101 kg (222 lb 6.4 oz)   LMP 04/14/2022   SpO2 98%   BMI 34.32 kg/m²     Physical Exam  In general, well-appearing, not in acute distress, alert and oriented.  HEENT: No pallor or icterus  Neck: No lymphadenopathy, no stiffness.  Supple.  .No JVD  Chest: Clear to auscultation, good air entry.  CVS: S1 and S2 regular.  No murmur, no gallop, S3 or S4.  No peripheral edema.  No carotid bruit.  Abdomen: Soft, no tenderness, no hepatosplenomegaly.  Extremities: No calf tenderness.  No peripheral edema.   Neuro: No focal deficits.  Psych: Mood and affect normal.  Good judgment and insight   Assessment/Plan   Problem List Items Addressed This Visit             ICD-10-CM    Bilateral sciatica M54.31, M54.32    Relevant Medications    cyclobenzaprine (Flexeril) 10 mg tablet    Gastroesophageal reflux disease K21.9    Hyperlipidemia E78.5    " Hypertension - Primary I10        Blood pressure is under control.  Continue same treatment  Flexeril as needed for flareup of sciatica  Unusual side effects  Can stop colloidal.  Plant-based diet and high-fiber diet discussed.  Will check lipids next time  Continue statin if high due to fatty liver  Plant-based diet and psyllium powder and vitamin E 400 mg discussed  Avoid processed foods and added sugar  Continue meloxicam for arthritis  She will quit smoking on her own  Declined Prevnar 20  Follow-up in 6 months

## 2025-01-24 DIAGNOSIS — I10 PRIMARY HYPERTENSION: ICD-10-CM

## 2025-01-24 RX ORDER — METOPROLOL SUCCINATE 25 MG/1
25 TABLET, EXTENDED RELEASE ORAL DAILY
Qty: 90 TABLET | Refills: 3 | Status: SHIPPED | OUTPATIENT
Start: 2025-01-24 | End: 2026-01-24

## 2025-01-28 DIAGNOSIS — I10 PRIMARY HYPERTENSION: ICD-10-CM

## 2025-01-28 RX ORDER — METOPROLOL SUCCINATE 25 MG/1
25 TABLET, EXTENDED RELEASE ORAL DAILY
Qty: 90 TABLET | Refills: 3 | Status: SHIPPED | OUTPATIENT
Start: 2025-01-28 | End: 2026-01-28

## 2025-01-30 ENCOUNTER — TELEPHONE (OUTPATIENT)
Dept: PRIMARY CARE | Facility: CLINIC | Age: 55
End: 2025-01-30
Payer: COMMERCIAL

## 2025-01-30 DIAGNOSIS — M15.0 PRIMARY OSTEOARTHRITIS INVOLVING MULTIPLE JOINTS: Primary | ICD-10-CM

## 2025-01-30 RX ORDER — MELOXICAM 15 MG/1
15 TABLET ORAL DAILY
Qty: 30 TABLET | Refills: 5 | Status: SHIPPED | OUTPATIENT
Start: 2025-01-30 | End: 2025-07-29

## 2025-01-30 NOTE — TELEPHONE ENCOUNTER
Rx Refill Request Telephone Encounter    Name:  Marlin Lopez  :  [id number]  Medication Name:  MELOXICAM   15 MG          Specific Pharmacy location:  [facility]  Date of last appointment:  2024  Date of next appointment:  2025  Best number to reach patient:

## 2025-02-17 DIAGNOSIS — I10 PRIMARY HYPERTENSION: ICD-10-CM

## 2025-02-17 RX ORDER — LOSARTAN POTASSIUM 100 MG/1
100 TABLET ORAL DAILY
Qty: 90 TABLET | Refills: 3 | Status: SHIPPED | OUTPATIENT
Start: 2025-02-17 | End: 2026-02-17

## 2025-03-12 ENCOUNTER — TELEPHONE (OUTPATIENT)
Dept: PRIMARY CARE | Facility: CLINIC | Age: 55
End: 2025-03-12
Payer: COMMERCIAL

## 2025-03-12 NOTE — TELEPHONE ENCOUNTER
PT, SAID SHE FEELS LIKE SHE HAS A SINUS INFECTION AND WANTS TO KNOW CAN YOU CALL HER IN A ANTIBIOTIC.

## 2025-04-28 DIAGNOSIS — I10 PRIMARY HYPERTENSION: ICD-10-CM

## 2025-04-28 RX ORDER — CHLORTHALIDONE 25 MG/1
25 TABLET ORAL DAILY
Qty: 90 TABLET | Refills: 3 | Status: SHIPPED | OUTPATIENT
Start: 2025-04-28 | End: 2026-04-28

## 2025-05-16 DIAGNOSIS — M54.32 BILATERAL SCIATICA: ICD-10-CM

## 2025-05-16 DIAGNOSIS — M54.31 BILATERAL SCIATICA: ICD-10-CM

## 2025-05-16 RX ORDER — CYCLOBENZAPRINE HCL 10 MG
10 TABLET ORAL 3 TIMES DAILY PRN
Qty: 30 TABLET | Refills: 0 | Status: SHIPPED | OUTPATIENT
Start: 2025-05-16

## 2025-05-21 ENCOUNTER — APPOINTMENT (OUTPATIENT)
Dept: ORTHOPEDIC SURGERY | Facility: CLINIC | Age: 55
End: 2025-05-21
Payer: COMMERCIAL

## 2025-05-21 DIAGNOSIS — M65.331 TRIGGER MIDDLE FINGER OF RIGHT HAND: ICD-10-CM

## 2025-05-21 DIAGNOSIS — M79.641 RIGHT HAND PAIN: Primary | ICD-10-CM

## 2025-05-21 PROCEDURE — 20550 NJX 1 TENDON SHEATH/LIGAMENT: CPT | Mod: RT | Performed by: ORTHOPAEDIC SURGERY

## 2025-05-21 PROCEDURE — 99213 OFFICE O/P EST LOW 20 MIN: CPT | Mod: 25 | Performed by: ORTHOPAEDIC SURGERY

## 2025-05-21 PROCEDURE — 2500000004 HC RX 250 GENERAL PHARMACY W/ HCPCS (ALT 636 FOR OP/ED): Performed by: ORTHOPAEDIC SURGERY

## 2025-05-21 RX ORDER — LIDOCAINE HYDROCHLORIDE 20 MG/ML
0.5 INJECTION, SOLUTION INFILTRATION; PERINEURAL
Status: COMPLETED | OUTPATIENT
Start: 2025-05-21 | End: 2025-05-21

## 2025-05-21 RX ORDER — TRIAMCINOLONE ACETONIDE 40 MG/ML
10 INJECTION, SUSPENSION INTRA-ARTICULAR; INTRAMUSCULAR
Status: COMPLETED | OUTPATIENT
Start: 2025-05-21 | End: 2025-05-21

## 2025-05-21 RX ADMIN — TRIAMCINOLONE ACETONIDE 10 MG: 400 INJECTION, SUSPENSION INTRA-ARTICULAR; INTRAMUSCULAR at 09:26

## 2025-05-21 RX ADMIN — LIDOCAINE HYDROCHLORIDE 0.5 ML: 20 INJECTION, SOLUTION INFILTRATION; PERINEURAL at 09:26

## 2025-05-21 NOTE — PROGRESS NOTES
Subjective    Patient ID: Marlin Lopez is a 55 y.o. female.    Chief Complaint: Follow-up and Trigger Finger of the Right Middle Finger (FUV R 3RD TF INJ LAST DONE 9/13/24)     Last Surgery: No surgery found  Last Surgery Date: No surgery found    HPI  The patient returns today for follow-up of her right middle finger trigger digit.  She has treated this conservatively with a single Kenalog injection and October 2024.  She had good relief up until about 1 month ago.    Objective   Ortho Exam  The patient is in no acute distress.  Exam of her right hand and wrist reveals her skin envelope is intact.  She is tender to palpation over the middle finger trigger digit.  The middle finger was locking with flexion at the PIP joint.  She had no other areas of point tenderness.    Assessment/Plan   Encounter Diagnoses:  Right hand pain    Trigger middle finger of right hand    The patient has recurrence of her right middle finger trigger digit.  She does not wish to undergo surgery.  She would prefer her second injection today.    Hand / UE Inj/Asp: R long A1 for trigger finger on 5/21/2025 9:26 AM  Indications: pain  Details: 25 G needle, volar approach  Medications: 10 mg triamcinolone acetonide 40 mg/mL; 0.5 mL lidocaine 20 mg/mL (2 %)  Outcome: tolerated well, no immediate complications  Procedure, treatment alternatives, risks and benefits explained, specific risks discussed. Consent was given by the patient. Immediately prior to procedure a time out was called to verify the correct patient, procedure, equipment, support staff and site/side marked as required. Patient was prepped and draped in the usual sterile fashion.       The patient was informed to takes about a week for the injection of an effect.  She otherwise will follow-up as her symptoms dictate.

## 2025-06-11 ENCOUNTER — APPOINTMENT (OUTPATIENT)
Dept: PRIMARY CARE | Facility: CLINIC | Age: 55
End: 2025-06-11
Payer: COMMERCIAL

## 2025-06-11 VITALS
WEIGHT: 219.6 LBS | HEART RATE: 75 BPM | DIASTOLIC BLOOD PRESSURE: 82 MMHG | SYSTOLIC BLOOD PRESSURE: 128 MMHG | HEIGHT: 68 IN | BODY MASS INDEX: 33.28 KG/M2 | OXYGEN SATURATION: 99 %

## 2025-06-11 DIAGNOSIS — M10.9 ACUTE GOUT INVOLVING TOE OF LEFT FOOT, UNSPECIFIED CAUSE: ICD-10-CM

## 2025-06-11 DIAGNOSIS — E66.811 CLASS 1 OBESITY DUE TO EXCESS CALORIES WITH SERIOUS COMORBIDITY AND BODY MASS INDEX (BMI) OF 33.0 TO 33.9 IN ADULT: ICD-10-CM

## 2025-06-11 DIAGNOSIS — N60.02 BREAST CYST, LEFT: ICD-10-CM

## 2025-06-11 DIAGNOSIS — Z12.31 ENCOUNTER FOR SCREENING MAMMOGRAM FOR BREAST CANCER: ICD-10-CM

## 2025-06-11 DIAGNOSIS — M15.0 PRIMARY OSTEOARTHRITIS INVOLVING MULTIPLE JOINTS: ICD-10-CM

## 2025-06-11 DIAGNOSIS — F17.200 HEAVY SMOKER: ICD-10-CM

## 2025-06-11 DIAGNOSIS — I10 PRIMARY HYPERTENSION: Primary | ICD-10-CM

## 2025-06-11 DIAGNOSIS — Z87.891 AGGRESSIVE FORMER SMOKER: ICD-10-CM

## 2025-06-11 DIAGNOSIS — E78.2 MIXED HYPERLIPIDEMIA: ICD-10-CM

## 2025-06-11 DIAGNOSIS — E66.09 CLASS 1 OBESITY DUE TO EXCESS CALORIES WITH SERIOUS COMORBIDITY AND BODY MASS INDEX (BMI) OF 33.0 TO 33.9 IN ADULT: ICD-10-CM

## 2025-06-11 DIAGNOSIS — K21.9 GASTROESOPHAGEAL REFLUX DISEASE WITHOUT ESOPHAGITIS: ICD-10-CM

## 2025-06-11 PROCEDURE — 90677 PCV20 VACCINE IM: CPT | Performed by: INTERNAL MEDICINE

## 2025-06-11 PROCEDURE — 3008F BODY MASS INDEX DOCD: CPT | Performed by: INTERNAL MEDICINE

## 2025-06-11 PROCEDURE — 3074F SYST BP LT 130 MM HG: CPT | Performed by: INTERNAL MEDICINE

## 2025-06-11 PROCEDURE — 99214 OFFICE O/P EST MOD 30 MIN: CPT | Performed by: INTERNAL MEDICINE

## 2025-06-11 PROCEDURE — 90471 IMMUNIZATION ADMIN: CPT | Performed by: INTERNAL MEDICINE

## 2025-06-11 PROCEDURE — 3079F DIAST BP 80-89 MM HG: CPT | Performed by: INTERNAL MEDICINE

## 2025-06-11 ASSESSMENT — PATIENT HEALTH QUESTIONNAIRE - PHQ9
2. FEELING DOWN, DEPRESSED OR HOPELESS: NOT AT ALL
SUM OF ALL RESPONSES TO PHQ9 QUESTIONS 1 AND 2: 0
1. LITTLE INTEREST OR PLEASURE IN DOING THINGS: NOT AT ALL

## 2025-06-11 NOTE — ASSESSMENT & PLAN NOTE
Blood pressures under control.  Same treatment.  Check kidney function  Orders:    CBC and Auto Differential; Future    Comprehensive Metabolic Panel; Future    Lipid Panel; Future

## 2025-06-11 NOTE — PROGRESS NOTES
"  Patient ID: Marlin Lopez is a 55 y.o. female who presents for  follow up  HPI  compliant with blood pressure medications. Denies headache, dizziness, chest pain, shortness of breath or palpitation.  Watch salt intake in diet.  No exertional chest pain or dizziness or palpitation. No blurred vision or double vision     Had toe sweling left second recently.better     Gerd controlled on rx.takes prilosec every other day    On meloxicam daily,pain controlled.   needs flexeril prn for back.fell when she slipped in grocery store.    Still smoking.not reaady    No breast lump or pain  Review of Systems  GENERAL;:Denies weight changes,fatigue,fever,chills or sweats  HEENT:Denies headache,sorethroat,sinus drainage ,vision or hearing issues  CVS:No chest pain,sob or palpitation.No leg swelling  RESP:No c/c cough,cp,sob or wheezing  GI:NO abdominal pain,nausea,heartburn,vomiting,or bowel changes.  :No painful urination,frequency or hematuria.No incontinence  MSK:has  joint pain   NEURO:No dizziness,weakness,numbness or tingling.No memory issues  PSYCH:No anxiety,depression or sleep issues       Blood pressure 128/82, pulse 75, height 1.715 m (5' 7.5\"), weight 99.6 kg (219 lb 9.6 oz), last menstrual period 04/14/2022, SpO2 99%.       Physical Exam  Gen. patient is not in acute distress  HEENT: No pallor or icterus.  Neck: Supple,no lAD,No JVD  CVS: S1, S2, regular in rate and rhythm. No peripheral edema.  Chest: Clear to auscultation bilateral. Good air entry.  Abd:Soft,nontender  Extremities no edema or tenderness.blister second toe left  Neuro: Grossly nonfocal, alert and oriented.  Psych: Mood and affect normal, good judgment and insight         Assessment/Plan   Assessment & Plan  Primary hypertension  Blood pressures under control.  Same treatment.  Check kidney function  Orders:    CBC and Auto Differential; Future    Comprehensive Metabolic Panel; Future    Lipid Panel; Future    Mixed hyperlipidemia  Check lipids.  " Continue TLC.  If LDL is high we will do CT scoring.  Has multiple risk factors including family history for mother  Orders:    Comprehensive Metabolic Panel; Future    Lipid Panel; Future    Gastroesophageal reflux disease without esophagitis  GERD controlled on treatment       Class 1 obesity due to excess calories with serious comorbidity and body mass index (BMI) of 33.0 to 33.9 in adult  TLC discussed.  Try to eat more plant-based foods.       Primary osteoarthritis involving multiple joints  Continue meloxicam       Acute gout involving toe of left foot, unspecified cause  Check uric acid.  Low purine diet discussed  Orders:    Uric acid; Future    Aggressive former smoker  Discussed lung cancer screening and smoking cessation.  She will work on quitting smoking on her own  No symptoms of lung cancer    31 pack-year  CT ordered  Shared decision made  Orders:    CT lung screening low dose; Future    Heavy smoker    Orders:    CT lung screening low dose; Future    Encounter for screening mammogram for breast cancer    Orders:    BI mammo bilateral screening tomosynthesis; Future    Breast cyst, left  Ultrasound if needed  Orders:    BI US breast complete left; Future       Had hepatitis screening at work.  Reviewed  Continue medications  Follow-up in 6 months      Tori Madera MD

## 2025-06-11 NOTE — ASSESSMENT & PLAN NOTE
Check lipids.  Continue TLC.  If LDL is high we will do CT scoring.  Has multiple risk factors including family history for mother  Orders:    Comprehensive Metabolic Panel; Future    Lipid Panel; Future

## 2025-06-12 DIAGNOSIS — E78.00 PURE HYPERCHOLESTEROLEMIA: Primary | ICD-10-CM

## 2025-06-12 LAB
ALBUMIN SERPL-MCNC: 4.6 G/DL (ref 3.6–5.1)
ALP SERPL-CCNC: 94 U/L (ref 37–153)
ALT SERPL-CCNC: 28 U/L (ref 6–29)
ANION GAP SERPL CALCULATED.4IONS-SCNC: 13 MMOL/L (CALC) (ref 7–17)
AST SERPL-CCNC: 21 U/L (ref 10–35)
BASOPHILS # BLD AUTO: 33 CELLS/UL (ref 0–200)
BASOPHILS NFR BLD AUTO: 0.4 %
BILIRUB SERPL-MCNC: 0.6 MG/DL (ref 0.2–1.2)
BUN SERPL-MCNC: 20 MG/DL (ref 7–25)
CALCIUM SERPL-MCNC: 9.6 MG/DL (ref 8.6–10.4)
CHLORIDE SERPL-SCNC: 101 MMOL/L (ref 98–110)
CHOLEST SERPL-MCNC: 259 MG/DL
CHOLEST/HDLC SERPL: 3.8 (CALC)
CO2 SERPL-SCNC: 26 MMOL/L (ref 20–32)
CREAT SERPL-MCNC: 0.72 MG/DL (ref 0.5–1.03)
EGFRCR SERPLBLD CKD-EPI 2021: 99 ML/MIN/1.73M2
EOSINOPHIL # BLD AUTO: 108 CELLS/UL (ref 15–500)
EOSINOPHIL NFR BLD AUTO: 1.3 %
ERYTHROCYTE [DISTWIDTH] IN BLOOD BY AUTOMATED COUNT: 12.6 % (ref 11–15)
GLUCOSE SERPL-MCNC: 94 MG/DL (ref 65–99)
HCT VFR BLD AUTO: 44.5 % (ref 35–45)
HDLC SERPL-MCNC: 69 MG/DL
HGB BLD-MCNC: 14.7 G/DL (ref 11.7–15.5)
LDLC SERPL CALC-MCNC: 161 MG/DL (CALC)
LYMPHOCYTES # BLD AUTO: 2764 CELLS/UL (ref 850–3900)
LYMPHOCYTES NFR BLD AUTO: 33.3 %
MCH RBC QN AUTO: 30.6 PG (ref 27–33)
MCHC RBC AUTO-ENTMCNC: 33 G/DL (ref 32–36)
MCV RBC AUTO: 92.5 FL (ref 80–100)
MONOCYTES # BLD AUTO: 540 CELLS/UL (ref 200–950)
MONOCYTES NFR BLD AUTO: 6.5 %
NEUTROPHILS # BLD AUTO: 4856 CELLS/UL (ref 1500–7800)
NEUTROPHILS NFR BLD AUTO: 58.5 %
NONHDLC SERPL-MCNC: 190 MG/DL (CALC)
PLATELET # BLD AUTO: 245 THOUSAND/UL (ref 140–400)
PMV BLD REES-ECKER: 11.6 FL (ref 7.5–12.5)
POTASSIUM SERPL-SCNC: 3.8 MMOL/L (ref 3.5–5.3)
PROT SERPL-MCNC: 7.5 G/DL (ref 6.1–8.1)
RBC # BLD AUTO: 4.81 MILLION/UL (ref 3.8–5.1)
SODIUM SERPL-SCNC: 140 MMOL/L (ref 135–146)
TRIGL SERPL-MCNC: 144 MG/DL
URATE SERPL-MCNC: 6.2 MG/DL (ref 2.5–7)
WBC # BLD AUTO: 8.3 THOUSAND/UL (ref 3.8–10.8)

## 2025-06-12 NOTE — RESULT ENCOUNTER NOTE
Cholesterol is high.  Other labs okay.  As discussed I ordered the calcium scoring test.  Please complete it after doing the lung cancer screening

## 2025-07-14 DIAGNOSIS — M15.0 PRIMARY OSTEOARTHRITIS INVOLVING MULTIPLE JOINTS: ICD-10-CM

## 2025-07-14 RX ORDER — MELOXICAM 15 MG/1
15 TABLET ORAL DAILY
Qty: 30 TABLET | Refills: 5 | Status: SHIPPED | OUTPATIENT
Start: 2025-07-14 | End: 2026-01-10

## 2025-08-06 ENCOUNTER — HOSPITAL ENCOUNTER (OUTPATIENT)
Dept: RADIOLOGY | Facility: HOSPITAL | Age: 55
Discharge: HOME | End: 2025-08-06
Payer: COMMERCIAL

## 2025-08-06 DIAGNOSIS — E78.00 PURE HYPERCHOLESTEROLEMIA: ICD-10-CM

## 2025-08-06 PROCEDURE — 75571 CT HRT W/O DYE W/CA TEST: CPT

## 2025-08-13 ENCOUNTER — HOSPITAL ENCOUNTER (OUTPATIENT)
Dept: RADIOLOGY | Facility: CLINIC | Age: 55
Discharge: HOME | End: 2025-08-13
Payer: COMMERCIAL

## 2025-08-13 DIAGNOSIS — Z12.31 ENCOUNTER FOR SCREENING MAMMOGRAM FOR BREAST CANCER: ICD-10-CM

## 2025-08-13 DIAGNOSIS — F17.200 HEAVY SMOKER: ICD-10-CM

## 2025-08-13 DIAGNOSIS — N60.02 BREAST CYST, LEFT: ICD-10-CM

## 2025-08-13 DIAGNOSIS — Z87.891 AGGRESSIVE FORMER SMOKER: ICD-10-CM

## 2025-08-13 PROCEDURE — 77062 BREAST TOMOSYNTHESIS BI: CPT | Performed by: RADIOLOGY

## 2025-08-13 PROCEDURE — 77062 BREAST TOMOSYNTHESIS BI: CPT

## 2025-08-13 PROCEDURE — 76642 ULTRASOUND BREAST LIMITED: CPT | Mod: LT

## 2025-08-13 PROCEDURE — 77066 DX MAMMO INCL CAD BI: CPT | Performed by: RADIOLOGY

## 2025-08-13 PROCEDURE — 71271 CT THORAX LUNG CANCER SCR C-: CPT

## 2025-08-13 PROCEDURE — 76982 USE 1ST TARGET LESION: CPT | Mod: LT

## 2025-08-13 PROCEDURE — 76642 ULTRASOUND BREAST LIMITED: CPT | Performed by: RADIOLOGY

## 2025-08-18 DIAGNOSIS — E78.2 MIXED HYPERLIPIDEMIA: Primary | ICD-10-CM

## 2025-08-18 DIAGNOSIS — I25.10 CORONARY ARTERY CALCIFICATION: ICD-10-CM

## 2025-11-19 ENCOUNTER — APPOINTMENT (OUTPATIENT)
Dept: OBSTETRICS AND GYNECOLOGY | Facility: CLINIC | Age: 55
End: 2025-11-19
Payer: COMMERCIAL

## 2025-12-10 ENCOUNTER — APPOINTMENT (OUTPATIENT)
Dept: PRIMARY CARE | Facility: CLINIC | Age: 55
End: 2025-12-10
Payer: COMMERCIAL